# Patient Record
Sex: FEMALE | Race: WHITE | Employment: FULL TIME | URBAN - METROPOLITAN AREA
[De-identification: names, ages, dates, MRNs, and addresses within clinical notes are randomized per-mention and may not be internally consistent; named-entity substitution may affect disease eponyms.]

---

## 2017-02-28 ENCOUNTER — HOSPITAL ENCOUNTER (OUTPATIENT)
Dept: MAMMOGRAPHY | Age: 42
Discharge: HOME | End: 2017-02-28
Attending: OBSTETRICS & GYNECOLOGY | Admitting: OBSTETRICS & GYNECOLOGY

## 2017-02-28 DIAGNOSIS — N63.20 LEFT BREAST MASS: ICD-10-CM

## 2018-04-23 ENCOUNTER — HOSPITAL ENCOUNTER (OUTPATIENT)
Dept: MAMMOGRAPHY | Age: 43
Discharge: HOME | End: 2018-04-23
Attending: OBSTETRICS & GYNECOLOGY | Admitting: OBSTETRICS & GYNECOLOGY

## 2018-04-23 DIAGNOSIS — Z12.31 ENCOUNTER FOR SCREENING MAMMOGRAM FOR BREAST CANCER: ICD-10-CM

## 2019-04-29 ENCOUNTER — HOSPITAL ENCOUNTER (OUTPATIENT)
Dept: WOMENS IMAGING | Age: 44
Discharge: HOME | End: 2019-04-29
Payer: COMMERCIAL

## 2019-04-29 DIAGNOSIS — Z12.31 ENCOUNTER FOR SCREENING MAMMOGRAM FOR BREAST CANCER: ICD-10-CM

## 2019-04-29 PROCEDURE — 77063 BREAST TOMOSYNTHESIS BI: CPT

## 2019-05-17 RX ORDER — M-VIT,TX,IRON,MINS/CALC/FOLIC 27MG-0.4MG
1 TABLET ORAL DAILY
COMMUNITY

## 2019-05-17 RX ORDER — FERROUS SULFATE 325(65) MG
325 TABLET ORAL
COMMUNITY

## 2019-05-23 ENCOUNTER — ANESTHESIA (OUTPATIENT)
Dept: OPERATING ROOM | Age: 44
End: 2019-05-23
Payer: COMMERCIAL

## 2019-05-23 ENCOUNTER — HOSPITAL ENCOUNTER (OUTPATIENT)
Age: 44
Setting detail: OUTPATIENT SURGERY
Discharge: HOME | End: 2019-05-23
Attending: OBSTETRICS & GYNECOLOGY | Admitting: OBSTETRICS & GYNECOLOGY
Payer: COMMERCIAL

## 2019-05-23 ENCOUNTER — ANESTHESIA EVENT (OUTPATIENT)
Dept: OPERATING ROOM | Age: 44
End: 2019-05-23
Payer: COMMERCIAL

## 2019-05-23 VITALS
RESPIRATION RATE: 16 BRPM | DIASTOLIC BLOOD PRESSURE: 54 MMHG | HEART RATE: 51 BPM | TEMPERATURE: 97 F | SYSTOLIC BLOOD PRESSURE: 101 MMHG | BODY MASS INDEX: 22.13 KG/M2 | WEIGHT: 146 LBS | HEIGHT: 68 IN | OXYGEN SATURATION: 100 %

## 2019-05-23 VITALS
DIASTOLIC BLOOD PRESSURE: 69 MMHG | OXYGEN SATURATION: 100 % | RESPIRATION RATE: 7 BRPM | SYSTOLIC BLOOD PRESSURE: 119 MMHG

## 2019-05-23 DIAGNOSIS — N93.9 ABNORMAL UTERINE BLEEDING: ICD-10-CM

## 2019-05-23 DIAGNOSIS — N84.0 ENDOMETRIAL POLYP: ICD-10-CM

## 2019-05-23 LAB — PREGNANCY, URINE: NEGATIVE

## 2019-05-23 PROCEDURE — 3600000004 HC SURGERY LEVEL 4 BASE: Performed by: OBSTETRICS & GYNECOLOGY

## 2019-05-23 PROCEDURE — 2709999900 HC NON-CHARGEABLE SUPPLY: Performed by: OBSTETRICS & GYNECOLOGY

## 2019-05-23 PROCEDURE — 2500000003 HC RX 250 WO HCPCS: Performed by: NURSE ANESTHETIST, CERTIFIED REGISTERED

## 2019-05-23 PROCEDURE — 3700000001 HC ADD 15 MINUTES (ANESTHESIA): Performed by: OBSTETRICS & GYNECOLOGY

## 2019-05-23 PROCEDURE — 2580000003 HC RX 258: Performed by: OBSTETRICS & GYNECOLOGY

## 2019-05-23 PROCEDURE — 7100000011 HC PHASE II RECOVERY - ADDTL 15 MIN: Performed by: OBSTETRICS & GYNECOLOGY

## 2019-05-23 PROCEDURE — 88341 IMHCHEM/IMCYTCHM EA ADD ANTB: CPT

## 2019-05-23 PROCEDURE — 6360000002 HC RX W HCPCS: Performed by: NURSE ANESTHETIST, CERTIFIED REGISTERED

## 2019-05-23 PROCEDURE — 3700000000 HC ANESTHESIA ATTENDED CARE: Performed by: OBSTETRICS & GYNECOLOGY

## 2019-05-23 PROCEDURE — 2580000003 HC RX 258: Performed by: ANESTHESIOLOGY

## 2019-05-23 PROCEDURE — 88342 IMHCHEM/IMCYTCHM 1ST ANTB: CPT

## 2019-05-23 PROCEDURE — 84703 CHORIONIC GONADOTROPIN ASSAY: CPT

## 2019-05-23 PROCEDURE — 7100000000 HC PACU RECOVERY - FIRST 15 MIN: Performed by: OBSTETRICS & GYNECOLOGY

## 2019-05-23 PROCEDURE — 88305 TISSUE EXAM BY PATHOLOGIST: CPT

## 2019-05-23 PROCEDURE — 7100000010 HC PHASE II RECOVERY - FIRST 15 MIN: Performed by: OBSTETRICS & GYNECOLOGY

## 2019-05-23 PROCEDURE — 7100000001 HC PACU RECOVERY - ADDTL 15 MIN: Performed by: OBSTETRICS & GYNECOLOGY

## 2019-05-23 PROCEDURE — 3600000014 HC SURGERY LEVEL 4 ADDTL 15MIN: Performed by: OBSTETRICS & GYNECOLOGY

## 2019-05-23 PROCEDURE — 2720000010 HC SURG SUPPLY STERILE: Performed by: OBSTETRICS & GYNECOLOGY

## 2019-05-23 RX ORDER — HYDRALAZINE HYDROCHLORIDE 20 MG/ML
10 INJECTION INTRAMUSCULAR; INTRAVENOUS EVERY 10 MIN PRN
Status: DISCONTINUED | OUTPATIENT
Start: 2019-05-23 | End: 2019-05-23 | Stop reason: HOSPADM

## 2019-05-23 RX ORDER — PROCHLORPERAZINE EDISYLATE 5 MG/ML
5 INJECTION INTRAMUSCULAR; INTRAVENOUS
Status: DISCONTINUED | OUTPATIENT
Start: 2019-05-23 | End: 2019-05-23 | Stop reason: HOSPADM

## 2019-05-23 RX ORDER — LIDOCAINE HYDROCHLORIDE 10 MG/ML
0.3 INJECTION, SOLUTION EPIDURAL; INFILTRATION; INTRACAUDAL; PERINEURAL
Status: DISCONTINUED | OUTPATIENT
Start: 2019-05-23 | End: 2019-05-23 | Stop reason: HOSPADM

## 2019-05-23 RX ORDER — KETOROLAC TROMETHAMINE 30 MG/ML
INJECTION, SOLUTION INTRAMUSCULAR; INTRAVENOUS PRN
Status: DISCONTINUED | OUTPATIENT
Start: 2019-05-23 | End: 2019-05-23 | Stop reason: SDUPTHER

## 2019-05-23 RX ORDER — SODIUM CHLORIDE 0.9 % (FLUSH) 0.9 %
10 SYRINGE (ML) INJECTION EVERY 12 HOURS SCHEDULED
Status: DISCONTINUED | OUTPATIENT
Start: 2019-05-23 | End: 2019-05-23 | Stop reason: HOSPADM

## 2019-05-23 RX ORDER — DOXYCYCLINE HYCLATE 20 MG
20 TABLET ORAL PRN
COMMUNITY

## 2019-05-23 RX ORDER — OXYCODONE HYDROCHLORIDE 5 MG/1
5 TABLET ORAL
Status: DISCONTINUED | OUTPATIENT
Start: 2019-05-23 | End: 2019-05-23 | Stop reason: HOSPADM

## 2019-05-23 RX ORDER — IBUPROFEN 600 MG/1
600 TABLET ORAL EVERY 6 HOURS PRN
Qty: 15 TABLET | Refills: 1 | Status: SHIPPED | OUTPATIENT
Start: 2019-05-23

## 2019-05-23 RX ORDER — PROPOFOL 10 MG/ML
INJECTION, EMULSION INTRAVENOUS PRN
Status: DISCONTINUED | OUTPATIENT
Start: 2019-05-23 | End: 2019-05-23 | Stop reason: SDUPTHER

## 2019-05-23 RX ORDER — ONDANSETRON 2 MG/ML
4 INJECTION INTRAMUSCULAR; INTRAVENOUS
Status: DISCONTINUED | OUTPATIENT
Start: 2019-05-23 | End: 2019-05-23 | Stop reason: HOSPADM

## 2019-05-23 RX ORDER — MIDAZOLAM HYDROCHLORIDE 1 MG/ML
INJECTION INTRAMUSCULAR; INTRAVENOUS PRN
Status: DISCONTINUED | OUTPATIENT
Start: 2019-05-23 | End: 2019-05-23 | Stop reason: SDUPTHER

## 2019-05-23 RX ORDER — SODIUM CHLORIDE 0.9 % (FLUSH) 0.9 %
10 SYRINGE (ML) INJECTION EVERY 12 HOURS SCHEDULED
Status: CANCELLED | OUTPATIENT
Start: 2019-05-23

## 2019-05-23 RX ORDER — SODIUM CHLORIDE 0.9 % (FLUSH) 0.9 %
10 SYRINGE (ML) INJECTION PRN
Status: CANCELLED | OUTPATIENT
Start: 2019-05-23

## 2019-05-23 RX ORDER — ONDANSETRON 2 MG/ML
INJECTION INTRAMUSCULAR; INTRAVENOUS PRN
Status: DISCONTINUED | OUTPATIENT
Start: 2019-05-23 | End: 2019-05-23 | Stop reason: SDUPTHER

## 2019-05-23 RX ORDER — SODIUM CHLORIDE, SODIUM LACTATE, POTASSIUM CHLORIDE, AND CALCIUM CHLORIDE .6; .31; .03; .02 G/100ML; G/100ML; G/100ML; G/100ML
IRRIGANT IRRIGATION PRN
Status: DISCONTINUED | OUTPATIENT
Start: 2019-05-23 | End: 2019-05-23 | Stop reason: ALTCHOICE

## 2019-05-23 RX ORDER — SODIUM CHLORIDE, SODIUM LACTATE, POTASSIUM CHLORIDE, CALCIUM CHLORIDE 600; 310; 30; 20 MG/100ML; MG/100ML; MG/100ML; MG/100ML
INJECTION, SOLUTION INTRAVENOUS CONTINUOUS
Status: DISCONTINUED | OUTPATIENT
Start: 2019-05-23 | End: 2019-05-23 | Stop reason: HOSPADM

## 2019-05-23 RX ORDER — DEXAMETHASONE SODIUM PHOSPHATE 4 MG/ML
INJECTION, SOLUTION INTRA-ARTICULAR; INTRALESIONAL; INTRAMUSCULAR; INTRAVENOUS; SOFT TISSUE PRN
Status: DISCONTINUED | OUTPATIENT
Start: 2019-05-23 | End: 2019-05-23 | Stop reason: SDUPTHER

## 2019-05-23 RX ORDER — MORPHINE SULFATE 4 MG/ML
3 INJECTION, SOLUTION INTRAMUSCULAR; INTRAVENOUS
Status: ACTIVE | OUTPATIENT
Start: 2019-05-23 | End: 2019-05-23

## 2019-05-23 RX ORDER — SODIUM CHLORIDE, SODIUM LACTATE, POTASSIUM CHLORIDE, CALCIUM CHLORIDE 600; 310; 30; 20 MG/100ML; MG/100ML; MG/100ML; MG/100ML
INJECTION, SOLUTION INTRAVENOUS CONTINUOUS
Status: CANCELLED | OUTPATIENT
Start: 2019-05-23

## 2019-05-23 RX ORDER — MEPERIDINE HYDROCHLORIDE 50 MG/ML
12.5 INJECTION INTRAMUSCULAR; INTRAVENOUS; SUBCUTANEOUS EVERY 5 MIN PRN
Status: DISCONTINUED | OUTPATIENT
Start: 2019-05-23 | End: 2019-05-23 | Stop reason: HOSPADM

## 2019-05-23 RX ORDER — LIDOCAINE HYDROCHLORIDE 20 MG/ML
INJECTION, SOLUTION INFILTRATION; PERINEURAL PRN
Status: DISCONTINUED | OUTPATIENT
Start: 2019-05-23 | End: 2019-05-23 | Stop reason: SDUPTHER

## 2019-05-23 RX ORDER — SODIUM CHLORIDE 0.9 % (FLUSH) 0.9 %
10 SYRINGE (ML) INJECTION PRN
Status: DISCONTINUED | OUTPATIENT
Start: 2019-05-23 | End: 2019-05-23 | Stop reason: HOSPADM

## 2019-05-23 RX ORDER — SODIUM CHLORIDE 9 MG/ML
INJECTION, SOLUTION INTRAVENOUS CONTINUOUS PRN
Status: DISCONTINUED | OUTPATIENT
Start: 2019-05-23 | End: 2019-05-23 | Stop reason: SDUPTHER

## 2019-05-23 RX ORDER — OMEPRAZOLE 20 MG/1
20 CAPSULE, DELAYED RELEASE ORAL DAILY
COMMUNITY

## 2019-05-23 RX ORDER — FENTANYL CITRATE 50 UG/ML
INJECTION, SOLUTION INTRAMUSCULAR; INTRAVENOUS PRN
Status: DISCONTINUED | OUTPATIENT
Start: 2019-05-23 | End: 2019-05-23 | Stop reason: SDUPTHER

## 2019-05-23 RX ORDER — HYDROCODONE BITARTRATE AND ACETAMINOPHEN 5; 325 MG/1; MG/1
1 TABLET ORAL EVERY 4 HOURS PRN
Qty: 4 TABLET | Refills: 0 | Status: SHIPPED | OUTPATIENT
Start: 2019-05-23 | End: 2019-05-26

## 2019-05-23 RX ADMIN — DEXAMETHASONE SODIUM PHOSPHATE 8 MG: 4 INJECTION, SOLUTION INTRAMUSCULAR; INTRAVENOUS at 07:46

## 2019-05-23 RX ADMIN — ONDANSETRON 4 MG: 2 INJECTION INTRAMUSCULAR; INTRAVENOUS at 07:46

## 2019-05-23 RX ADMIN — SODIUM CHLORIDE: 9 INJECTION, SOLUTION INTRAVENOUS at 08:25

## 2019-05-23 RX ADMIN — KETOROLAC TROMETHAMINE 30 MG: 30 INJECTION, SOLUTION INTRAMUSCULAR; INTRAVENOUS at 07:46

## 2019-05-23 RX ADMIN — SODIUM CHLORIDE, POTASSIUM CHLORIDE, SODIUM LACTATE AND CALCIUM CHLORIDE: 600; 310; 30; 20 INJECTION, SOLUTION INTRAVENOUS at 08:28

## 2019-05-23 RX ADMIN — FENTANYL CITRATE 100 MCG: 50 INJECTION INTRAMUSCULAR; INTRAVENOUS at 07:40

## 2019-05-23 RX ADMIN — PROPOFOL 200 MG: 10 INJECTION, EMULSION INTRAVENOUS at 07:40

## 2019-05-23 RX ADMIN — LIDOCAINE HYDROCHLORIDE 50 MG: 20 INJECTION, SOLUTION INFILTRATION; PERINEURAL at 07:40

## 2019-05-23 RX ADMIN — SODIUM CHLORIDE, POTASSIUM CHLORIDE, SODIUM LACTATE AND CALCIUM CHLORIDE: 600; 310; 30; 20 INJECTION, SOLUTION INTRAVENOUS at 06:40

## 2019-05-23 RX ADMIN — MIDAZOLAM HYDROCHLORIDE 2 MG: 2 INJECTION, SOLUTION INTRAMUSCULAR; INTRAVENOUS at 07:34

## 2019-05-23 ASSESSMENT — PULMONARY FUNCTION TESTS
PIF_VALUE: 10
PIF_VALUE: 17
PIF_VALUE: 12
PIF_VALUE: 16
PIF_VALUE: 13
PIF_VALUE: 12
PIF_VALUE: 3
PIF_VALUE: 12
PIF_VALUE: 1
PIF_VALUE: 0
PIF_VALUE: 0
PIF_VALUE: 12
PIF_VALUE: 15
PIF_VALUE: 1
PIF_VALUE: 16
PIF_VALUE: 2
PIF_VALUE: 16
PIF_VALUE: 14
PIF_VALUE: 16
PIF_VALUE: 3
PIF_VALUE: 16
PIF_VALUE: 3
PIF_VALUE: 9
PIF_VALUE: 12
PIF_VALUE: 12
PIF_VALUE: 0
PIF_VALUE: 2
PIF_VALUE: 3
PIF_VALUE: 12
PIF_VALUE: 12
PIF_VALUE: 16
PIF_VALUE: 1
PIF_VALUE: 3
PIF_VALUE: 6
PIF_VALUE: 5
PIF_VALUE: 10
PIF_VALUE: 16
PIF_VALUE: 12
PIF_VALUE: 12
PIF_VALUE: 16
PIF_VALUE: 16
PIF_VALUE: 10
PIF_VALUE: 3

## 2019-05-23 ASSESSMENT — PAIN DESCRIPTION - ONSET: ONSET: GRADUAL

## 2019-05-23 ASSESSMENT — PAIN - FUNCTIONAL ASSESSMENT: PAIN_FUNCTIONAL_ASSESSMENT: 0-10

## 2019-05-23 ASSESSMENT — PAIN SCALES - GENERAL
PAINLEVEL_OUTOF10: 0
PAINLEVEL_OUTOF10: 1

## 2019-05-23 ASSESSMENT — PAIN DESCRIPTION - PAIN TYPE: TYPE: ACUTE PAIN;SURGICAL PAIN

## 2019-05-23 ASSESSMENT — ENCOUNTER SYMPTOMS: SHORTNESS OF BREATH: 0

## 2019-05-23 ASSESSMENT — PAIN DESCRIPTION - LOCATION: LOCATION: ABDOMEN

## 2019-05-23 NOTE — OP NOTE
[facility]                                OPERATIVE REPORT    PATIENT NAME: Cliff Larios             :        1975  MED REC NO:   [id number]                          ROOM:  ACCOUNT NO:   [de-identified]                           ADMIT DATE: 2019  PROVIDER:     Mikal Christian MD    DATE OF PROCEDURE:  2019    PREOPERATIVE DIAGNOSES:  Abnormal uterine bleeding, endometrial polyps. POSTOPERATIVE DIAGNOSES:  Abnormal uterine bleeding, endometrial polyps. PROCEDURE:  Diagnostic hysteroscopy, D and C with MyoSure polypectomy,  endometrial ablation with Novasure. SURGEON:  Mikal Christian MD    ANESTHESIA:  General.    ESTIMATED BLOOD LOSS:  Minimal.    SPECIMEN:  Endometrial curettings including endometrial polyps. INDICATIONS AND CONSENT:  A 47-year-old  2, para 2 presents for  ablation of the endometrium. Side effects, benefits, risk were  outlined. Consent was obtained. All questions were answered. Preoperative evaluation including an ultrasound which documented  questionable endometrial polyps as well as a benign endometrial biopsy. DESCRIPTION OF PROCEDURE:  The patient was taken to the OR on  2019. She was placed on the OR table in a supine position. General anesthetic was administered. She was placed in dorsal lithotomy  position utilizing candy-cane stirrups. Exam under anesthesia was  performed. Uterus was mid position, upper limits normal size, normal  contour. The adnexa without masses. The patient was prepped and draped  in sterile fashion. A bladder was emptied in a sterile fashion. Weighted speculum was placed into the posterior vagina. Single-tooth  tenaculum used to grasp the cervix. The cervix was gently dilated to  admit a hysteroscope. Hysteroscopy was performed. The  endocervix was unremarkable.   Endometrial cavity had several small  polyps on the anterior wall of the uterus. Both tubal ostia were  clearly seen. MyoSure device was primed. MyoSure device was used to  remove the three polyps in their entirety. Hysteroscope was then  removed. Uterus was sounded. Endometrial cavity was 5 cm in length. Curettage was performed and specimen was sent endometrial curettings and  endometrial polyp. Novasure device was brought upon the field. Novasure device was placed  into the endometrial cavity and engaged. Cavity length was 5, cavity  width was 4, power was 110 quezada. Integrity test was carried out and  passed. Ablation was begun and continued uninterrupted for 1 minute 29  seconds. Device was disengaged and removed. Hysteroscopy was performed  noting an excellent endometrial ablation. Procedure was concluded at  this point. The patient did receive Toradol. Weighted speculum and  single-tooth tenaculum were removed. All sponges were accounted for. The patient was awakened in the OR and taken to recovery room in good  condition. Specimen was endometrial curettings including polyp. Fluid deficit  approximately 100 mL.   Blood loss minimal.        Reva Ceron MD    D: 05/23/2019 8:23:11       T: 05/23/2019 8:31:12     [email or web address]  Job#: [id number]     Doc#: [id number]    CC:

## 2019-05-23 NOTE — ANESTHESIA POSTPROCEDURE EVALUATION
Department of Anesthesiology  Postprocedure Note    Patient: Zayra Bauer  MRN: [id number]  YOB: 1975  Date of evaluation: 5/23/2019  Time:  8:26 AM     Procedure Summary     Date:  05/23/19 Room / Location:  [place] [address] / [facility] OR    Anesthesia Start:  0730 Anesthesia Stop:  1618    Procedure:  DILATATION AND CURETTAGE HYSTEROSCOPY, [place] ENDOMETRIAL ABLATION  CPT CODE - 01343 (N/A Vagina ) Diagnosis:       Abnormal uterine bleeding      Endometrial polyp      (ABNORMAL UTERINE BLEEDING; ENDOMETRIAL POLYP)    Surgeon:  Esteban Heaton MD Responsible Provider:  Linda De Guzman MD    Anesthesia Type:  general ASA Status:  1          Anesthesia Type: general    Cira Phase I: Cira Score: 9    Cira Phase II:      Last vitals: Reviewed and per EMR flowsheets.        Anesthesia Post Evaluation    Patient location during evaluation: PACU  Patient participation: complete - patient participated  Level of consciousness: awake and alert  Airway patency: patent  Nausea & Vomiting: no nausea  Complications: no  Cardiovascular status: hemodynamically stable  Respiratory status: acceptable  Hydration status: euvolemic

## 2019-05-23 NOTE — PROGRESS NOTES
4 Eyes Skin Assessment     The patient is being assess for   Admission    I agree that 2 RN's have performed a thorough Head to Toe Skin Assessment on the patient. ALL assessment sites listed below have been assessed. Areas assessed by both nurses:   [x]   Head, Face, and Ears   [x]   Shoulders, Back, and Chest, Abdomen  [x]   Arms, Elbows, and Hands   [x]   Coccyx, Sacrum, and Ischium  [x]   Legs, Feet, and Heels        Redness to both elbows    **SHARE this note so that the co-signing nurse is able to place an eSignature**    Co-signer eSignature: Electronically signed by Fran Posada RN on 5/23/19 at 6:46 AM    Does the Patient have Skin Breakdown?   No          Kan Prevention initiated:  No   Wound Care Orders initiated:  No      WO nurse consulted for Pressure Injury (Stage 3,4, Unstageable, DTI, NWPT, Complex wounds)and New or Established Ostomies:  No      Primary Nurse eSignature: Electronically signed by Hasmukh Dillon RN on 5/23/19 at 6:35 AM

## 2019-05-23 NOTE — ANESTHESIA PRE PROCEDURE
MD Alvaro        morphine injection 3 mg  3 mg Intravenous Q10 Min Carine Chen MD        oxyCODONE (ROXICODONE) immediate release tablet 5 mg  5 mg Oral Once PRN Carine Chen MD        ondansetron [facility]) injection 4 mg  4 mg Intravenous Once PRN Carine Chen MD        prochlorperazine (COMPAZINE) injection 5 mg  5 mg Intravenous Once PRN Carine Chen MD        hydrALAZINE (APRESOLINE) injection 10 mg  10 mg Intravenous Q10 Min PRN Carine Chen MD        meperidine (DEMEROL) injection 12.5 mg  12.5 mg Intravenous Q5 Min PRN Carine Chen MD           Allergies:  No Known Allergies    Problem List:  There is no problem list on file for this patient.       Past Medical History:        Diagnosis Date    Allergic rhinitis     Alopecia     Asthma     Eczema     Nephrolithiasis        Past Surgical History:        Procedure Laterality Date    LYMPH NODE BIOPSY      axilla-benign    OTHER SURGICAL HISTORY      ventral stent       Social History:    Social History     Tobacco Use    Smoking status: Former Smoker    Smokeless tobacco: Never Used    Tobacco comment: quit smoking 2007   Substance Use Topics    Alcohol use: Yes     Comment: occassional-                                Counseling given: Not Answered  Comment: quit smoking 2007      Vital Signs (Current):   Vitals:    05/17/19 1524 05/23/19 0641   BP:  113/62   Pulse:  64   Resp:  16   Temp:  98.4 °F (36.9 °C)   TempSrc:  Temporal   SpO2:  100%   Weight: 147 lb (66.7 kg) 146 lb (66.2 kg)   Height: 5' 8\" (1.727 m) 5' 8\" (1.727 m)                                              BP Readings from Last 3 Encounters:   05/23/19 113/62   10/04/16 130/70   10/26/11 113/56       NPO Status: Time of last liquid consumption: 2000                        Time of last solid consumption: 1830                        Date of last liquid consumption: 05/22/19                        Date of last solid food consumption: 05/22/19    BMI:   Wt Readings from Last 3 Encounters:   05/23/19 146 lb (66.2 kg)   10/04/16 155 lb (70.3 kg)   10/26/11 158 lb (71.7 kg)     Body mass index is 22.2 kg/m². CBC:   Lab Results   Component Value Date    WBC 11.0 10/23/2011    RBC 3.83 10/23/2011    HGB 12.2 10/23/2011    HCT 35.8 10/23/2011    MCV 93.5 10/23/2011    RDW 12.9 10/23/2011     10/23/2011       CMP:   Lab Results   Component Value Date     10/23/2011    K 3.3 10/23/2011     10/23/2011    CO2 24 10/23/2011    BUN 9 10/23/2011    CREATININE 0.7 10/23/2011    GFRAA >60 10/23/2011    GLUCOSE 135 10/23/2011    PROT 7.5 10/23/2011    CALCIUM 9.6 10/23/2011    BILITOT 0.41 10/23/2011    ALKPHOS 70 10/23/2011    AST 14 10/23/2011    ALT 12 10/23/2011       POC Tests: No results for input(s): POCGLU, POCNA, POCK, POCCL, POCBUN, POCHEMO, POCHCT in the last 72 hours. Coags: No results found for: PROTIME, INR, APTT    HCG (If Applicable):   Lab Results   Component Value Date    PREGTESTUR Negative 05/23/2019        ABGs: No results found for: PHART, PO2ART, RDE5LYF, BMF6IPX, BEART, A7LQIVXA     Type & Screen (If Applicable):  No results found for: [facility]    Anesthesia Evaluation  Patient summary reviewed no history of anesthetic complications:   Airway: Mallampati: II  TM distance: >3 FB     Mouth opening: > = 3 FB Dental:          Pulmonary: breath sounds clear to auscultation  (+) asthma (uses MDI x1/yr):     (-) COPD, shortness of breath and recent URI          Patient did not smoke on day of surgery. Cardiovascular:Negative CV ROS  Exercise tolerance: good (>4 METS),       (-) murmur      Rhythm: regular  Rate: normal                    Neuro/Psych:               GI/Hepatic/Renal:   (+) GERD: well controlled,      (-) no renal disease and no morbid obesity       Endo/Other:        (-) diabetes mellitus, hypothyroidism               Abdominal:           Vascular: negative vascular ROS.                                        Anesthesia Plan      general     ASA 1       Induction: intravenous. Anesthetic plan and risks discussed with patient, mother and child/children. Plan discussed with CRNA.                   Doug Perdomo MD   5/23/2019

## 2019-05-23 NOTE — H&P
I have reviewed the history and physical and examined the patient and find no relevant changes. I have reviewed with the patient and/or family the risks, benefits, and alternatives to the procedure.     Aster Solano

## 2019-05-24 NOTE — ANESTHESIA POSTPROCEDURE EVALUATION
Department of Anesthesiology  Postprocedure Note    Patient: Wisam Byrd  MRN: [id number]  YOB: 1975  Date of evaluation: 5/23/2019  Time:  9:40 PM     Procedure Summary     Date:  05/23/19 Room / Location:  [place] [address] / [facility] OR    Anesthesia Start:  0730 Anesthesia Stop:  9267    Procedure:  DILATATION AND CURETTAGE HYSTEROSCOPY, [place] ENDOMETRIAL ABLATION  CPT CODE - 36036 (N/A Vagina ) Diagnosis:       Abnormal uterine bleeding      Endometrial polyp      (ABNORMAL UTERINE BLEEDING; ENDOMETRIAL POLYP)    Surgeon:  Glenda Cortes MD Responsible Provider:  Dali Perez MD    Anesthesia Type:  general ASA Status:  1          Anesthesia Type: general    Cira Phase I: Cira Score: 9    Cira Phase II: Cira Score: 10    Last vitals: Reviewed and per EMR flowsheets. Anesthesia Post Evaluation    Patient location during evaluation: PACU  Patient participation: complete - patient participated  Level of consciousness: awake and alert  Airway patency: patent  Nausea & Vomiting: no nausea and no vomiting  Complications: no  Cardiovascular status: blood pressure returned to baseline  Respiratory status: acceptable  Hydration status: euvolemic  Comments: VSS on transfer to phase 2 recovery. No anesthetic complications.

## 2024-10-22 RX ORDER — MULTIVIT-MIN/IRON/FOLIC ACID/K 18-600-40
2000 CAPSULE ORAL DAILY
COMMUNITY

## 2024-10-22 RX ORDER — TRIAMCINOLONE ACETONIDE 1 MG/G
CREAM TOPICAL 2 TIMES DAILY
COMMUNITY

## 2024-10-22 RX ORDER — L.ACID/L.CASEI/B.BIF/B.LON/FOS 2B CELL-50
1 CAPSULE ORAL DAILY
COMMUNITY

## 2024-10-22 RX ORDER — ALBUTEROL SULFATE 90 UG/1
2 INHALANT RESPIRATORY (INHALATION) EVERY 6 HOURS PRN
COMMUNITY
Start: 2023-11-21

## 2024-10-22 RX ORDER — PIMECROLIMUS 10 MG/G
CREAM TOPICAL 2 TIMES DAILY
COMMUNITY

## 2024-10-22 NOTE — PROGRESS NOTES
10/22/2024 00240 PM:        [facility] PRE-SURGICAL TESTING INSTRUCTIONS                      PRIOR TO PROCEDURE DATE:    1. PLEASE FOLLOW ANY INSTRUCTIONS GIVEN TO YOU PER YOUR SURGEON.      2. Arrange for someone to drive you home and be with you for the first 24 hours after discharge for your safety after your procedure for which you received sedation. Ensure it is someone we can share information with regarding your discharge.     NOTE: At this time ONLY 2 ADULTS may accompany you. NO CHILDREN UNDER AGE OF 16.    One person ENCOURAGED to stay at hospital entire time if outpatient surgery      3. You must contact your surgeon for instructions IF:  You are taking any blood thinners, aspirin, anti-inflammatory or vitamins.   Contact your ordering physician/surgeon for prescription medication instructions as soon as possible, especially if taking blood thinners, aspirin, heart, or diabetic medication.   There is a change in your physical condition such as a cold, fever, rash, cuts, sores, or any other infection, especially near your surgical site.    4. Do not drink alcohol the day before or day of your procedure.  Do not use any recreational marijuana at least 24 hours or street drugs (heroin, cocaine) at minimum 5 days prior to your procedure.     5. A Pre-Surgical History and Physical MUST be completed WITHIN 30 DAYS OR LESS prior to your procedure.by your Physician or an Urgent Care        THE DAY OF YOUR PROCEDURE:  1.  Follow instructions for ARRIVAL TIME as DIRECTED BY YOUR SURGEON. [place] [id number]     2. Enter the MAIN entrance from [facility] and follow the signs to the free Parking Garage or  Parking (offered free of charge 7 am-5pm).      3. Enter the Main Entrance of the hospital (do not enter from the lower level of the parking garage). Upon entrance, check in with the  at the surgical information desk on your LEFT.   Bring your  yeimi your surgery site UNLESS instructed to by your surgeon.     4. When you are in the operating room, your surgical site will be cleansed with a special soap, and in most cases, you will be given an antibiotic before the surgery begins.      What to expect AFTER your procedure?  1. Immediately following your procedure, your will be taken to the PACU for the first phase of your recovery.  Your nurse will help you recover from any potential side effects of anesthesia, such as extreme drowsiness, changes in your vital signs or breathing patterns. Nausea, headache, muscle aches, or sore throat may also occur after anesthesia.  Your nurse will help you manage these potential side effects.    2. For comfort and safety, arrange to have someone at home with you for the first 24 hours after discharge.    3. You and your family will be given written instructions about your diet, activity, dressing care, medications, and return visits.     4. Once at home, should issues with nausea, pain, or bleeding occur, or should you notice any signs of infection, you should call your surgeon.    5. Always clean your hands before and after caring for your wound. Do not let your family touch your surgery site without cleaning their hands.     6. Narcotic pain medications can cause significant constipation.  You may want to add a stool softener to your postoperative medication schedule or speak to your surgeon on how best to manage this SIDE EFFECT.    SPECIAL INSTRUCTIONS: BRING INHALER    Thank you for allowing us to care for you.  We strive to exceed your expectations in the delivery of care and service provided to you and your family.     If you need to contact the Pre-Admission Testing staff for any reason, please call us at [phone]. PRE OP HISTORY AND PHYSICAL CAN BE FAXED [phone].    Instructions reviewed with patient during preadmission testing phone interview.  Dana Horowitz RN.10/22/2024 .12:21 PM      ADDITIONAL

## 2024-10-25 ENCOUNTER — ANESTHESIA (OUTPATIENT)
Dept: OPERATING ROOM | Age: 49
End: 2024-10-25
Payer: COMMERCIAL

## 2024-10-25 ENCOUNTER — HOSPITAL ENCOUNTER (OUTPATIENT)
Age: 49
Setting detail: OUTPATIENT SURGERY
Discharge: HOME | End: 2024-10-25
Attending: PODIATRIST | Admitting: PODIATRIST
Payer: COMMERCIAL

## 2024-10-25 ENCOUNTER — ANESTHESIA EVENT (OUTPATIENT)
Dept: OPERATING ROOM | Age: 49
End: 2024-10-25
Payer: COMMERCIAL

## 2024-10-25 VITALS
DIASTOLIC BLOOD PRESSURE: 65 MMHG | BODY MASS INDEX: 25.73 KG/M2 | RESPIRATION RATE: 14 BRPM | HEIGHT: 68 IN | OXYGEN SATURATION: 96 % | SYSTOLIC BLOOD PRESSURE: 132 MMHG | HEART RATE: 84 BPM | WEIGHT: 169.8 LBS | TEMPERATURE: 97 F

## 2024-10-25 DIAGNOSIS — G89.18 POST-OP PAIN: Primary | ICD-10-CM

## 2024-10-25 LAB — HCG UR QL: NEGATIVE

## 2024-10-25 PROCEDURE — 7100000000 HC PACU RECOVERY - FIRST 15 MIN: Performed by: PODIATRIST

## 2024-10-25 PROCEDURE — 7100000011 HC PHASE II RECOVERY - ADDTL 15 MIN: Performed by: PODIATRIST

## 2024-10-25 PROCEDURE — 2720000010 HC SURG SUPPLY STERILE: Performed by: PODIATRIST

## 2024-10-25 PROCEDURE — 3600000014 HC SURGERY LEVEL 4 ADDTL 15MIN: Performed by: PODIATRIST

## 2024-10-25 PROCEDURE — 2580000003 HC RX 258: Performed by: NURSE ANESTHETIST, CERTIFIED REGISTERED

## 2024-10-25 PROCEDURE — 3700000001 HC ADD 15 MINUTES (ANESTHESIA): Performed by: PODIATRIST

## 2024-10-25 PROCEDURE — 7100000010 HC PHASE II RECOVERY - FIRST 15 MIN: Performed by: PODIATRIST

## 2024-10-25 PROCEDURE — 6370000000 HC RX 637 (ALT 250 FOR IP)

## 2024-10-25 PROCEDURE — 2500000003 HC RX 250 WO HCPCS: Performed by: PODIATRIST

## 2024-10-25 PROCEDURE — 6360000002 HC RX W HCPCS: Performed by: PODIATRIST

## 2024-10-25 PROCEDURE — 2580000003 HC RX 258: Performed by: ANESTHESIOLOGY

## 2024-10-25 PROCEDURE — 3600000004 HC SURGERY LEVEL 4 BASE: Performed by: PODIATRIST

## 2024-10-25 PROCEDURE — 3700000000 HC ANESTHESIA ATTENDED CARE: Performed by: PODIATRIST

## 2024-10-25 PROCEDURE — 2709999900 HC NON-CHARGEABLE SUPPLY: Performed by: PODIATRIST

## 2024-10-25 PROCEDURE — 2580000003 HC RX 258: Performed by: PODIATRIST

## 2024-10-25 PROCEDURE — 6360000002 HC RX W HCPCS: Performed by: NURSE ANESTHETIST, CERTIFIED REGISTERED

## 2024-10-25 PROCEDURE — 2500000003 HC RX 250 WO HCPCS: Performed by: NURSE ANESTHETIST, CERTIFIED REGISTERED

## 2024-10-25 PROCEDURE — 7100000001 HC PACU RECOVERY - ADDTL 15 MIN: Performed by: PODIATRIST

## 2024-10-25 PROCEDURE — 84703 CHORIONIC GONADOTROPIN ASSAY: CPT

## 2024-10-25 PROCEDURE — C1763 CONN TISS, NON-HUMAN: HCPCS | Performed by: PODIATRIST

## 2024-10-25 DEVICE — AXOGUARD HA+ NERVE PROTECTOR IS PROVIDED STERILE, FOR SINGLE USE ONLY, AND IN A VARIETY OF SIZES TO MEET THE SURGEON’S NEEDS.K223640: AXOGUARD HA+ NERVE PROTECTOR IS A SURGICAL IMPLANT THAT PROVIDES NON-CONSTRICTING PROTECTION FOR NON-TRANSECTED PERIPHERAL NERVE INJURIES. AXOGUARD HA+ NERVE PROTECTOR IS DESIGNED TO BE AN INTERFACE BETWEEN THE NERVE AND THE SURROUNDING TISSUE. AXOGUARD HA+ NERVE PROTECTOR IS COMPRISED OF AN EXTRACELLULAR MATRIX (ECM) AND IS FULLY REMODELED DURING THE HEALING PROCESS. WHEN HYDRATED, AXOGUARD HA+ NERVE PROTECTOR IS EASY TO HANDLE, SOFT, PLIABLE, NONFRIABLE, AND POROUS. THE LUBRICANT COATING ON AXOGUARD HA+ NERVE PROTECTOR IS COMPOSED OF SODIUM HYALURONATE AND SODIUM ALGINATE. WHEN HYDRATED, THE LUBRICANT COATING REDUCES FRICTION BETWEEN THE NERVE AND THE SURROUNDING TISSUE. AXOGUARD HA+ NERVE PROTECTOR IS FLEXIBLE TO ACCOMMODATE MOVEMENT OF THE JOINT AND ASSOCIATED TENDONS AND HAS SUFFICIENT MECHANICAL STRENGTH TO HOLD SUTURES. K231708: AXOGUARD HA+ NERVE PROTECTOR IS A SURGICAL IMPLANT THAT PROVIDES NON-CONSTRICTING PROTECTION FOR PERIPHERAL NERVE INJURIES. AXOGUARD HA+ NERVE PROTECTOR IS DESIGNED TO AID IN COAPTATION AND PROTECTION OF PERIPHERAL NERVE INJURIES BY SERVING AS AN INTERFACE BETWEEN THE NERVE AND THE SURROUNDING TISSUE AND ALSO PROVIDES TENSION RELIEF WHEN USED AS A COAPTATION AID. AXOGUARD HA+ NERVE PROTECTOR IS COMPRISED OF AN EXTRACELLULAR MATRIX (ECM) AND IS FULLY REMODELED DURING THE HEALING PROCESS. WHEN HYDRATED, AXOGUARD HA+ NERVE PROTECTOR IS EASY TO HANDLE, SOFT, PLIABLE, NONFRIABLE, AND POROUS. THE LUBRICANT COATING ON AXOGUARD HA+ NERVE PROTECTOR IS COMPOSED OF SODIUM HYALURONATE AND SODIUM ALGINATE. WHEN HYDRATED, THE LUBRICANT COATING REDUCES FRICTION BETWEEN THE NERVE AND THE SURROUNDING TISSUE. AXOGUARD HA+ NERVE PROTECTOR IS FLEXIBLE TO ACCOMMODATE MOVEMENT OF THE JOINT AND HAS SUFFICIENT MECHANICAL STRENGTH TO HOLD SUTURES.
Type: IMPLANTABLE DEVICE | Site: FOOT | Status: FUNCTIONAL
Brand: AXOGUARD HA+ NERVE PROTECTOR

## 2024-10-25 RX ORDER — MIDAZOLAM HYDROCHLORIDE 1 MG/ML
INJECTION, SOLUTION INTRAMUSCULAR; INTRAVENOUS
Status: DISCONTINUED | OUTPATIENT
Start: 2024-10-25 | End: 2024-10-25 | Stop reason: SDUPTHER

## 2024-10-25 RX ORDER — SODIUM CHLORIDE 9 MG/ML
INJECTION, SOLUTION INTRAVENOUS CONTINUOUS
Status: DISCONTINUED | OUTPATIENT
Start: 2024-10-25 | End: 2024-10-25 | Stop reason: HOSPADM

## 2024-10-25 RX ORDER — FENTANYL CITRATE 50 UG/ML
50 INJECTION, SOLUTION INTRAMUSCULAR; INTRAVENOUS EVERY 5 MIN PRN
Status: DISCONTINUED | OUTPATIENT
Start: 2024-10-25 | End: 2024-10-25 | Stop reason: HOSPADM

## 2024-10-25 RX ORDER — HYDROMORPHONE HYDROCHLORIDE 1 MG/ML
0.25 INJECTION, SOLUTION INTRAMUSCULAR; INTRAVENOUS; SUBCUTANEOUS EVERY 5 MIN PRN
Status: DISCONTINUED | OUTPATIENT
Start: 2024-10-25 | End: 2024-10-25 | Stop reason: HOSPADM

## 2024-10-25 RX ORDER — HYDROMORPHONE HYDROCHLORIDE 2 MG/ML
INJECTION, SOLUTION INTRAMUSCULAR; INTRAVENOUS; SUBCUTANEOUS
Status: DISCONTINUED | OUTPATIENT
Start: 2024-10-25 | End: 2024-10-25 | Stop reason: SDUPTHER

## 2024-10-25 RX ORDER — OXYCODONE AND ACETAMINOPHEN 5; 325 MG/1; MG/1
TABLET ORAL
Status: COMPLETED
Start: 2024-10-25 | End: 2024-10-25

## 2024-10-25 RX ORDER — VANCOMYCIN HYDROCHLORIDE 1 G/20ML
INJECTION, POWDER, LYOPHILIZED, FOR SOLUTION INTRAVENOUS PRN
Status: DISCONTINUED | OUTPATIENT
Start: 2024-10-25 | End: 2024-10-25 | Stop reason: ALTCHOICE

## 2024-10-25 RX ORDER — OXYCODONE AND ACETAMINOPHEN 5; 325 MG/1; MG/1
1 TABLET ORAL ONCE
Status: COMPLETED | OUTPATIENT
Start: 2024-10-25 | End: 2024-10-25

## 2024-10-25 RX ORDER — SODIUM CHLORIDE 0.9 % (FLUSH) 0.9 %
5-40 SYRINGE (ML) INJECTION EVERY 12 HOURS SCHEDULED
Status: DISCONTINUED | OUTPATIENT
Start: 2024-10-25 | End: 2024-10-25 | Stop reason: HOSPADM

## 2024-10-25 RX ORDER — MIDAZOLAM HYDROCHLORIDE 1 MG/ML
2 INJECTION, SOLUTION INTRAMUSCULAR; INTRAVENOUS
Status: DISCONTINUED | OUTPATIENT
Start: 2024-10-25 | End: 2024-10-25 | Stop reason: HOSPADM

## 2024-10-25 RX ORDER — FENTANYL CITRATE 50 UG/ML
INJECTION, SOLUTION INTRAMUSCULAR; INTRAVENOUS
Status: DISCONTINUED | OUTPATIENT
Start: 2024-10-25 | End: 2024-10-25 | Stop reason: SDUPTHER

## 2024-10-25 RX ORDER — DEXAMETHASONE SODIUM PHOSPHATE 4 MG/ML
INJECTION, SOLUTION INTRA-ARTICULAR; INTRALESIONAL; INTRAMUSCULAR; INTRAVENOUS; SOFT TISSUE
Status: DISCONTINUED | OUTPATIENT
Start: 2024-10-25 | End: 2024-10-25 | Stop reason: SDUPTHER

## 2024-10-25 RX ORDER — PROPOFOL 10 MG/ML
INJECTION, EMULSION INTRAVENOUS
Status: DISCONTINUED | OUTPATIENT
Start: 2024-10-25 | End: 2024-10-25 | Stop reason: SDUPTHER

## 2024-10-25 RX ORDER — LABETALOL HYDROCHLORIDE 5 MG/ML
10 INJECTION, SOLUTION INTRAVENOUS
Status: DISCONTINUED | OUTPATIENT
Start: 2024-10-25 | End: 2024-10-25 | Stop reason: HOSPADM

## 2024-10-25 RX ORDER — OXYCODONE AND ACETAMINOPHEN 5; 325 MG/1; MG/1
1 TABLET ORAL EVERY 6 HOURS PRN
Qty: 28 TABLET | Refills: 0 | Status: SHIPPED | OUTPATIENT
Start: 2024-10-25 | End: 2024-11-01

## 2024-10-25 RX ORDER — SODIUM CHLORIDE 9 MG/ML
INJECTION, SOLUTION INTRAVENOUS
Status: DISCONTINUED | OUTPATIENT
Start: 2024-10-25 | End: 2024-10-25 | Stop reason: SDUPTHER

## 2024-10-25 RX ORDER — NALOXONE HYDROCHLORIDE 0.4 MG/ML
INJECTION, SOLUTION INTRAMUSCULAR; INTRAVENOUS; SUBCUTANEOUS PRN
Status: DISCONTINUED | OUTPATIENT
Start: 2024-10-25 | End: 2024-10-25 | Stop reason: HOSPADM

## 2024-10-25 RX ORDER — SODIUM CHLORIDE 9 MG/ML
INJECTION, SOLUTION INTRAVENOUS PRN
Status: DISCONTINUED | OUTPATIENT
Start: 2024-10-25 | End: 2024-10-25 | Stop reason: HOSPADM

## 2024-10-25 RX ORDER — LIDOCAINE HYDROCHLORIDE 10 MG/ML
1 INJECTION, SOLUTION EPIDURAL; INFILTRATION; INTRACAUDAL; PERINEURAL
Status: DISCONTINUED | OUTPATIENT
Start: 2024-10-25 | End: 2024-10-25 | Stop reason: HOSPADM

## 2024-10-25 RX ORDER — METOCLOPRAMIDE HYDROCHLORIDE 5 MG/ML
10 INJECTION INTRAMUSCULAR; INTRAVENOUS
Status: DISCONTINUED | OUTPATIENT
Start: 2024-10-25 | End: 2024-10-25 | Stop reason: HOSPADM

## 2024-10-25 RX ORDER — SODIUM CHLORIDE 0.9 % (FLUSH) 0.9 %
5-40 SYRINGE (ML) INJECTION PRN
Status: DISCONTINUED | OUTPATIENT
Start: 2024-10-25 | End: 2024-10-25 | Stop reason: HOSPADM

## 2024-10-25 RX ORDER — ONDANSETRON 2 MG/ML
INJECTION INTRAMUSCULAR; INTRAVENOUS
Status: DISCONTINUED | OUTPATIENT
Start: 2024-10-25 | End: 2024-10-25 | Stop reason: SDUPTHER

## 2024-10-25 RX ORDER — LIDOCAINE HYDROCHLORIDE 20 MG/ML
INJECTION, SOLUTION EPIDURAL; INFILTRATION; INTRACAUDAL; PERINEURAL PRN
Status: DISCONTINUED | OUTPATIENT
Start: 2024-10-25 | End: 2024-10-25 | Stop reason: HOSPADM

## 2024-10-25 RX ORDER — ONDANSETRON 2 MG/ML
4 INJECTION INTRAMUSCULAR; INTRAVENOUS
Status: DISCONTINUED | OUTPATIENT
Start: 2024-10-25 | End: 2024-10-25 | Stop reason: HOSPADM

## 2024-10-25 RX ORDER — ROCURONIUM BROMIDE 10 MG/ML
INJECTION, SOLUTION INTRAVENOUS
Status: DISCONTINUED | OUTPATIENT
Start: 2024-10-25 | End: 2024-10-25 | Stop reason: SDUPTHER

## 2024-10-25 RX ORDER — LIDOCAINE HYDROCHLORIDE 20 MG/ML
INJECTION, SOLUTION INTRAVENOUS
Status: DISCONTINUED | OUTPATIENT
Start: 2024-10-25 | End: 2024-10-25 | Stop reason: SDUPTHER

## 2024-10-25 RX ADMIN — OXYCODONE AND ACETAMINOPHEN 1 TABLET: 5; 325 TABLET ORAL at 16:18

## 2024-10-25 RX ADMIN — ONDANSETRON 4 MG: 2 INJECTION INTRAMUSCULAR; INTRAVENOUS at 14:04

## 2024-10-25 RX ADMIN — FENTANYL CITRATE 100 MCG: 50 INJECTION, SOLUTION INTRAMUSCULAR; INTRAVENOUS at 13:55

## 2024-10-25 RX ADMIN — WATER 2000 MG: 1 INJECTION INTRAMUSCULAR; INTRAVENOUS; SUBCUTANEOUS at 14:01

## 2024-10-25 RX ADMIN — HYDROMORPHONE HYDROCHLORIDE 2 MG: 2 INJECTION, SOLUTION INTRAMUSCULAR; INTRAVENOUS; SUBCUTANEOUS at 15:31

## 2024-10-25 RX ADMIN — SODIUM CHLORIDE: 9 INJECTION, SOLUTION INTRAVENOUS at 13:53

## 2024-10-25 RX ADMIN — ROCURONIUM BROMIDE 50 MG: 10 INJECTION, SOLUTION INTRAVENOUS at 13:57

## 2024-10-25 RX ADMIN — DEXAMETHASONE SODIUM PHOSPHATE 8 MG: 4 INJECTION INTRA-ARTICULAR; INTRALESIONAL; INTRAMUSCULAR; INTRAVENOUS; SOFT TISSUE at 14:04

## 2024-10-25 RX ADMIN — MIDAZOLAM HYDROCHLORIDE 2 MG: 2 INJECTION, SOLUTION INTRAMUSCULAR; INTRAVENOUS at 13:53

## 2024-10-25 RX ADMIN — SODIUM CHLORIDE: 9 INJECTION, SOLUTION INTRAVENOUS at 13:10

## 2024-10-25 RX ADMIN — OXYCODONE HYDROCHLORIDE AND ACETAMINOPHEN 1 TABLET: 5; 325 TABLET ORAL at 16:18

## 2024-10-25 RX ADMIN — LIDOCAINE HYDROCHLORIDE 100 MG: 20 INJECTION, SOLUTION INTRAVENOUS at 13:55

## 2024-10-25 RX ADMIN — PROPOFOL 150 MG: 10 INJECTION, EMULSION INTRAVENOUS at 13:57

## 2024-10-25 ASSESSMENT — PAIN DESCRIPTION - LOCATION: LOCATION: FOOT

## 2024-10-25 ASSESSMENT — ENCOUNTER SYMPTOMS: SHORTNESS OF BREATH: 0

## 2024-10-25 ASSESSMENT — PAIN SCALES - GENERAL: PAINLEVEL_OUTOF10: 2

## 2024-10-25 ASSESSMENT — PAIN DESCRIPTION - ORIENTATION: ORIENTATION: RIGHT

## 2024-10-25 ASSESSMENT — PAIN - FUNCTIONAL ASSESSMENT: PAIN_FUNCTIONAL_ASSESSMENT: 0-10

## 2024-10-25 NOTE — DISCHARGE INSTRUCTIONS
GUIDE FOR POST OP PATIENTS AFTER OUTPATIENT SURGERY - WEIGHT BEARING     Pain Management:  The surgery site will feel numb for about 8 hours up to 2 days due to the medication used during surgery.  Start taking your pain medication or over-the-counter Tylenol and Ibuprofen before the numbness wears off to reduce post-op pain and swelling. If taking Prescribed pain medication you can take 1 pain medication pill with 1 extra strength Tylenol three times a day. If you take 2 pain medication pills do not take any extra Tylenol with it.   If you are not taking any blood thinners, ibuprofen is okay for pain control, in addition to Tylenol. However, if you are on blood thinners, please refrain from taking any NSAIDs and use Tylenol and/or the pain medication prescribed exclusively.   Side Effects of Pain Medication:  Common side effects of prescribed pain medication include constipation, itching, dizziness, and nausea. Consider taking fiber to prevent constipation.   While on narcotic pain medication (e.g. Norco/hydrocodone or Percocet/oxycodone) especially within first 72 hours of surgery, you should take stool softener (e.g. Miralax, docusate, senna). Discontinue if you develop loose stool or diarrhea.  Dressing Care:  Keep the dressing from surgery dry and in place until your first follow-up visit, which will be in about 5-7 days. Please call our office to confirm this appointment.  Additional Information on the following Symptoms:  Heel Pain: Loosen the ACE bandage and pad the heel area with a washcloth for comfort and support.  Bleeding: It's common to see spots of blood on the bandage. If you notice a large amount of bleeding (larger than the size of a deck of cards) or if you notice dripping of blood through the dressing, please contact your doctor.  Numbness: It's normal to experience numbness for several days. When the numbing medication wears off, you may experience severe pain, so plan to take pain

## 2024-10-25 NOTE — PROGRESS NOTES
Ambulatory Surgery/Procedure Discharge Note    Vitals:    10/25/24 1605   BP: 132/65   Pulse: 84   Resp: 14   Temp: 97 °F (36.1 °C)   SpO2: 96%     Pt meets discharge criteria per Cira score.  In: 800 [I.V.:800]  Out: 10     Restroom use offered before discharge.  Yes    Pain assessment:  present - adequately treated  Pain Level: 2    Pt and S.O./family states \"ready to go home\". Pt alert and oriented x4. IV removed. Denies N/V . Dressing to R foot, c/d/I. Brace in place. Toes pink and warm to touch. Pt tolerating po intake. Discharge instructions given to pt and mother with pt permission. Pt and mother verbalized understanding of all instructions. Left with all belongings,  and discharge instructions. 1 prescriptions e-scribed to pt's pharmacy.    Patient discharged to home/self care. Patient discharged via wheel chair by transporter to waiting family/S.O.       10/25/2024 4:34 PM

## 2024-10-25 NOTE — ANESTHESIA PRE PROCEDURE
06:45 PM    GFRAA >60 10/23/2011 06:45 PM    LABGLOM >60 10/23/2011 06:45 PM    GLUCOSE 135 10/23/2011 06:45 PM    CALCIUM 9.6 10/23/2011 06:45 PM    BILITOT 0.41 10/23/2011 06:45 PM    ALKPHOS 70 10/23/2011 06:45 PM    AST 14 10/23/2011 06:45 PM    ALT 12 10/23/2011 06:45 PM       POC Tests: No results for input(s): \"POCGLU\", \"POCNA\", \"POCK\", \"POCCL\", \"POCBUN\", \"POCHEMO\", \"POCHCT\" in the last 72 hours.    Coags: No results found for: \"PROTIME\", \"INR\", \"APTT\"    HCG (If Applicable):   Lab Results   Component Value Date    PREGTESTUR Negative 05/23/2019        ABGs: No results found for: \"PHART\", \"PO2ART\", \"OMA9YBK\", \"JVC6YSY\", \"BEART\", \"O2UAALTF\"     Type & Screen (If Applicable):  No results found for: \"LABABO\"    Anesthesia Evaluation  Patient summary reviewed   no history of anesthetic complications:   Airway: Mallampati: II  TM distance: >3 FB     Mouth opening: > = 3 FB   Dental:          Pulmonary: breath sounds clear to auscultation  (+)           asthma:     (-) COPD, shortness of breath and recent URI          Patient did not smoke on day of surgery.                 Cardiovascular:Negative CV ROS  Exercise tolerance: good (>4 METS)      (-) murmur      Rhythm: regular  Rate: normal                    Neuro/Psych:               GI/Hepatic/Renal:   (+) GERD: well controlled, renal disease: kidney stones     (-) no morbid obesity       Endo/Other:        (-) diabetes mellitus, hypothyroidism               Abdominal:             Vascular: negative vascular ROS.         Other Findings:         Anesthesia Plan      general     ASA 2       Induction: intravenous.      Anesthetic plan and risks discussed with patient.      Plan discussed with CRNA.                  Stalin Nguyen MD   10/25/2024

## 2024-10-25 NOTE — BRIEF OP NOTE
Brief Postoperative Note      Patient: Sahara Holbrook  YOB: 1975  MRN: [id number]    Date of Procedure: 10/25/2024    Pre-Op Diagnosis Codes:      * Ashley's fracture of right foot, closed, initial encounter [S92.131A]     * Tarsal tunnel syndrome, right lower limb [G57.51]     * Plantar fasciitis, right [M72.2]     * Varicose veins of right lower extremity, unspecified whether complicated [I83.91]    Post-Op Diagnosis: Same       Procedure(s):  RIGHT FOOT REMOVAL OF JHA'S FRACTURE FRAGMENT WITH TARSAL TUNNEL RELEASE AND PLANTAR FASCIECTOMY  .    Surgeon(s):  Reg Witt DPM    Assistant:  Resident: Rashid Padilla DPM    Anesthesia: General    Hemostasis: anatomic dissection and electrocautery, pneumatic calf tourniquet at 250 for 75 minutes    Injectables: Pre-Op 10 cc of 2% Lidocaine plain and Post-Op 10 cc of 0.5 % Marcaine plain    Materials: 3-0 Vicryl, 3-0 Nylon    Estimated Blood Loss (mL): Minimal    Complications: None    Specimens:   * No specimens in log *    Implants:  Implant Name Type Inv. Item Serial No.  Lot No. LRB No. Used Action   PROTECTOR NERVE L 4 X W 2 CM PORCINE SODIUM HYALURONATE - [id number]  PROTECTOR NERVE L 4 X W 2 CM PORCINE SODIUM HYALURONATE [id number]- [id number] 1 Implanted         Drains: * No LDAs found *    Findings: Enlarged adductor hallucis muscle belly. Thickened plantar fascia with fraying of the medial band.  Satisfactory release of the tarsal tunnel as well as medial two thirds of plantar fascia confirmed both by visual confirmation as well as palpating the release bands.    Rashid Padilla DPM   Podiatric Resident PGY-2  Pager [phone] or PerfectServe  10/25/2024, 3:30 PM

## 2024-10-25 NOTE — FLOWSHEET NOTE
PACU Transfer to [facility]    Pt meets criteria to transfer to next phase of care per JAROCHO SCORE and ASPAN standards    Pain upon d/c from PACU:  denies pain  Jarocho Phase I: Jarocho Score: 9  Post-op Nausea & Vomiting: no nausea and no vomiting - refusing PO, wants to wait to get home.  Last Vitals:   Vitals:    10/25/24 1558   BP: 125/69   Pulse: 96   Resp: 13   Temp: 97.6 °F (36.4 °C)   SpO2: 99%       In: 800 [I.V.:800]  Out: 10       Pt taken to [facility] #6 by ALONZO RN  Family/identified trustworthy individual(s) updated on POC and change in pt's location.

## 2024-10-25 NOTE — ANESTHESIA POSTPROCEDURE EVALUATION
Department of Anesthesiology  Postprocedure Note    Patient: Sahara Holbrook  MRN: [id number]  YOB: 1975  Date of evaluation: 10/25/2024    Procedure Summary       Date: 10/25/24 Room / Location: [address]    Anesthesia Start: 1352 Anesthesia Stop:     Procedures:       RIGHT FOOT REMOVAL OF JHA'S FRACTURE FRAGMENT WITH TARSAL TUNNEL RELEASE AND PLANTAR FASCIECTOMY (Right: Foot)      . (Right: Foot) Diagnosis:       Ashley's fracture of right foot, closed, initial encounter      Tarsal tunnel syndrome, right lower limb      Plantar fasciitis, right      Varicose veins of right lower extremity, unspecified whether complicated      (Ashley's fracture of right foot, closed, initial encounter [S92.131A])      (Tarsal tunnel syndrome, right lower limb [G57.51])      (Plantar fasciitis, right [M72.2])      (Varicose veins of right lower extremity, unspecified whether complicated [I83.91])    Surgeons: Reg Witt DPM Responsible Provider: Alfonso Valdez MD    Anesthesia Type: general ASA Status: 2            Anesthesia Type: No value filed.    Cira Phase I: Cira Score: 9    Cira Phase II:      Anesthesia Post Evaluation    No notable events documented.

## 2024-10-25 NOTE — OP NOTE
Operative Note      Patient: Sahara Holbrook  YOB: 1975  MRN: [id number]    Date of Procedure: 10/25/2024    Pre-Op Diagnosis Codes:      * Ashley's fracture of right foot, closed, initial encounter [S92.131A]     * Tarsal tunnel syndrome, right lower limb [G57.51]     * Plantar fasciitis, right [M72.2]     * Varicose veins of right lower extremity, unspecified whether complicated [I83.91]    Post-Op Diagnosis: Same       Procedure(s):  RIGHT FOOT REMOVAL OF JHA'S FRACTURE FRAGMENT WITH TARSAL TUNNEL RELEASE AND PLANTAR FASCIECTOMY  .    Surgeon(s):  Reg Witt DPM    Assistant:   Resident: Rashid Padilla DPM    Anesthesia: General    Hemostasis: anatomic dissection and electrocautery, pneumatic calf tourniquet at 250 for 75 minutes    Injectables: Pre-Op 10 cc of 2% Lidocaine plain and Post-Op 10 cc of 0.5 % Marcaine plain    Materials: 3-0 Vicryl, 3-0 Nylon    Estimated Blood Loss (mL): Minimal    Complications: None    Specimens:   * No specimens in log *    Implants:  Implant Name Type Inv. Item Serial No.  Lot No. LRB No. Used Action   PROTECTOR NERVE L 4 X W 2 CM PORCINE SODIUM HYALURONATE - [id number]  PROTECTOR NERVE L 4 X W 2 CM PORCINE SODIUM HYALURONATE [id number]- [id number] Right 1 Implanted         Drains: * No LDAs found *    Findings:  Enlarged abductor hallucis muscle belly. Thickened plantar fascia with fraying of the medial band.  Satisfactory release of the tarsal tunnel as well as medial two thirds of plantar fascia confirmed both by visual confirmation as well as palpating the release bands.     Detailed Description of Procedure:   INDICATIONS FOR PROCEDURE: This patient has signs and symptoms clinically and radiographically consistent with the above mentioned preoperative diagnosis. Having failed conservative treatment, it was determined that the patient would benefit from surgical intervention. All potential risks, benefits, and  dressing was applied consisting of Betadine, Adaptic, gauze, ABD pads, cast padding, and Ace. The pneumatic calf tourniquet was rapidly deflated after a total time of 75 minutes and a prompt hyperemic response was noted on all aspects of the patient's right lower extremity.    END OF PROCEDURE: The patient tolerated the procedure and anesthesia well and was transported from the operating room to the PACU with vital signs stable and vascular status intact to all aspects of the patient's right lower extremity and digital capillary refill time immediate to the digits of the right foot. Following a period of post-operative monitoring, the patient will be discharged home. The patient is to follow-up with Dr. Reg Witt in his private office within 5-7 days. The patient is to keep dressing clean, dry and intact at all times. The patient is to call if any complications occur.    This operative report was dictated on behalf of Dr.Eric Miryam DPM.    Rashid Padilla DPM   Podiatric Resident PGY-2  Pager [phone] or [employer]  10/25/2024, 3:31 PM

## 2024-10-25 NOTE — INTERVAL H&P NOTE
Update History & Physical    The patient's History and Physical was reviewed with the patient and I examined the patient. There was no change. The surgical site was confirmed by the patient and me.     Plan: The risks, benefits, expected outcome, and alternative to the recommended procedure have been discussed with the patient. Patient understands and wants to proceed with the procedure.     Electronically signed by Reg Witt DPM on 10/25/2024 at 1:49 PM

## 2024-10-25 NOTE — FLOWSHEET NOTE
ADMISSION TO PACU     Patient: Sahara Holbrook    Procedure(s):  RIGHT FOOT REMOVAL OF JHA'S FRACTURE FRAGMENT WITH TARSAL TUNNEL RELEASE AND PLANTAR FASCIECTOMY  .    Level of consciousness: awake, but sleepy  Nursing Assessment: on stretcher, NC @ 4L. Boot/shoe to RLE with ace wrap noted. Toes warm, good cap refill. BELKIS pedal pulse d/t drsg    [facility] PACU #1: Cira Score: 7  1st PACU Vitals:   Vitals:    10/25/24 1535   BP: 130/70   Pulse: (!) 102   Resp: 14   Temp: 98.3 °F (36.8 °C)   SpO2: 100%     Pain upon arrival to PACU: none    No results for input(s): \"POCGLU\" in the last 72 hours.    Lab Results   Component Value Date/Time    HGB 12.2 10/23/2011 06:45 PM     10/23/2011 06:45 PM    K 3.3 (L) 10/23/2011 06:45 PM    CREATININE 0.7 10/23/2011 06:45 PM         Intake/Output Summary (Last 24 hours) at 10/25/2024 1540  Last data filed at 10/25/2024 1538  Gross per 24 hour   Intake 800 ml   Output 10 ml   Net 790 ml

## 2025-07-14 ENCOUNTER — APPOINTMENT (OUTPATIENT)
Dept: GENERAL RADIOLOGY | Age: 50
End: 2025-07-14
Payer: OTHER MISCELLANEOUS

## 2025-07-14 ENCOUNTER — HOSPITAL ENCOUNTER (EMERGENCY)
Age: 50
Discharge: HOME | End: 2025-07-14
Payer: OTHER MISCELLANEOUS

## 2025-07-14 VITALS
RESPIRATION RATE: 15 BRPM | DIASTOLIC BLOOD PRESSURE: 55 MMHG | TEMPERATURE: 98.1 F | SYSTOLIC BLOOD PRESSURE: 117 MMHG | OXYGEN SATURATION: 100 % | HEART RATE: 70 BPM
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
## 2025-07-14 PROCEDURE — 73130 X-RAY EXAM OF HAND: CPT

## 2025-07-14 PROCEDURE — 99283 EMERGENCY DEPT VISIT LOW MDM: CPT
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
## (undated) DEVICE — TELFA NON-ADHERENT ABSORBENT DRESSING: Brand: TELFA

## (undated) DEVICE — D&C: Brand: MEDLINE INDUSTRIES, INC.

## (undated) DEVICE — KIT THERMOABLATION 6MM ENDOMET DEV NOVASURE

## (undated) DEVICE — STERILE PVP: Brand: MEDLINE INDUSTRIES, INC.

## (undated) DEVICE — SUTURE VICRYL + SZ 2-0 L27IN ABSRB VLT L26MM CT-2 1/2 CIR VCP333H

## (undated) DEVICE — Y-TYPE TUR/BLADDER IRRIGATION SET, REGULATING CLAMP

## (undated) DEVICE — PODIATRY: Brand: MEDLINE INDUSTRIES, INC.

## (undated) DEVICE — SUTURE ETHILON SZ 3-0 L18IN NONABSORBABLE BLK PS-2 L19MM 3/8 1669H

## (undated) DEVICE — DRAPE,UNDERBUTTOCKS,PCH,STERILE: Brand: MEDLINE

## (undated) DEVICE — SMALL TEAR RASP (10.3 X 5.0MM)

## (undated) DEVICE — GLOVE ORANGE PI 8   MSG9080

## (undated) DEVICE — BANDAGE COMPR ELASTIC 9 FTX4 IN STRL ESMARCH LF

## (undated) DEVICE — PAD,ABDOMINAL,5"X9",ST,LF,25/BX: Brand: MEDLINE INDUSTRIES, INC.

## (undated) DEVICE — UNDERGLOVE SURG SZ 8 BLU LTX FREE SYN POLYISOPRENE POLYMER

## (undated) DEVICE — DEVICE TISS REM DIA3MM L25.25IN ENDOSCP F/ IU POLYPS

## (undated) DEVICE — PRECISION THIN (9.0 X 0.38 X 18.5MM)

## (undated) DEVICE — GLOVE ORANGE PI 7 1/2   MSG9075